# Patient Record
Sex: FEMALE | Race: WHITE | NOT HISPANIC OR LATINO
[De-identification: names, ages, dates, MRNs, and addresses within clinical notes are randomized per-mention and may not be internally consistent; named-entity substitution may affect disease eponyms.]

---

## 2017-08-17 PROBLEM — Z00.00 ENCOUNTER FOR PREVENTIVE HEALTH EXAMINATION: Noted: 2017-08-17

## 2017-11-07 ENCOUNTER — TRANSCRIPTION ENCOUNTER (OUTPATIENT)
Age: 22
End: 2017-11-07

## 2018-02-07 ENCOUNTER — APPOINTMENT (OUTPATIENT)
Dept: OPHTHALMOLOGY | Facility: CLINIC | Age: 23
End: 2018-02-07
Payer: COMMERCIAL

## 2018-02-07 DIAGNOSIS — H53.8 OTHER VISUAL DISTURBANCES: ICD-10-CM

## 2018-02-07 PROCEDURE — 92015 DETERMINE REFRACTIVE STATE: CPT

## 2018-02-07 PROCEDURE — 92004 COMPRE OPH EXAM NEW PT 1/>: CPT

## 2018-03-21 ENCOUNTER — EMERGENCY (EMERGENCY)
Facility: HOSPITAL | Age: 23
LOS: 1 days | Discharge: HOME | End: 2018-03-21
Attending: EMERGENCY MEDICINE | Admitting: EMERGENCY MEDICINE
Payer: COMMERCIAL

## 2018-03-21 VITALS
HEART RATE: 87 BPM | WEIGHT: 220.02 LBS | TEMPERATURE: 98 F | OXYGEN SATURATION: 99 % | SYSTOLIC BLOOD PRESSURE: 126 MMHG | HEIGHT: 62 IN | RESPIRATION RATE: 18 BRPM | DIASTOLIC BLOOD PRESSURE: 70 MMHG

## 2018-03-21 PROCEDURE — 99284 EMERGENCY DEPT VISIT MOD MDM: CPT

## 2018-03-21 PROCEDURE — 99283 EMERGENCY DEPT VISIT LOW MDM: CPT

## 2018-03-21 RX ADMIN — Medication 1 TABLET(S): at 02:00

## 2018-03-21 NOTE — ED PROVIDER NOTE - OBJECTIVE STATEMENT
22yo female who presents with small ddrainage from umbilicus tontie. pt denies trauma or injury to area but noticed blood from her belly button and she statse she has pain and drainage from it, no fever, chills, no vomiting, no other compalints

## 2018-03-21 NOTE — ED ADULT NURSE NOTE - CHPI ED SYMPTOMS NEG
no numbness/no vomiting/no decreased eating/drinking/no weakness/no chills/no tingling/no dizziness/no fever/no nausea

## 2019-01-27 ENCOUNTER — TRANSCRIPTION ENCOUNTER (OUTPATIENT)
Age: 24
End: 2019-01-27

## 2019-02-01 ENCOUNTER — APPOINTMENT (OUTPATIENT)
Dept: ORTHOPEDIC SURGERY | Facility: CLINIC | Age: 24
End: 2019-02-01

## 2019-02-01 ENCOUNTER — APPOINTMENT (OUTPATIENT)
Dept: ORTHOPEDIC SURGERY | Facility: CLINIC | Age: 24
End: 2019-02-01
Payer: COMMERCIAL

## 2019-02-01 VITALS
BODY MASS INDEX: 40.12 KG/M2 | HEIGHT: 62 IN | HEART RATE: 89 BPM | WEIGHT: 218 LBS | DIASTOLIC BLOOD PRESSURE: 78 MMHG | SYSTOLIC BLOOD PRESSURE: 124 MMHG

## 2019-02-01 DIAGNOSIS — Z78.9 OTHER SPECIFIED HEALTH STATUS: ICD-10-CM

## 2019-02-01 DIAGNOSIS — Z82.61 FAMILY HISTORY OF ARTHRITIS: ICD-10-CM

## 2019-02-01 DIAGNOSIS — Z80.3 FAMILY HISTORY OF MALIGNANT NEOPLASM OF BREAST: ICD-10-CM

## 2019-02-01 DIAGNOSIS — Z86.69 PERSONAL HISTORY OF OTHER DISEASES OF THE NERVOUS SYSTEM AND SENSE ORGANS: ICD-10-CM

## 2019-02-01 PROCEDURE — 99204 OFFICE O/P NEW MOD 45 MIN: CPT

## 2019-02-01 NOTE — PHYSICAL EXAM
[de-identified] : Constitutional: Well-nourished, well-developed, No acute distress, obese\par Respiratory:  Good respiratory effort, no SOB\par Lymphatic: No regional lymphadenopathy, no lymphedema\par Psychiatric: Pleasant and normal affect, alert and oriented x3\par Skin: Clean dry and intact B/L UE/LE\par Musculoskeletal: normal except where as noted in regional exam\par \par \par Right Knee:\par APPEARANCE: no marked deformities, no swelling or malalignment\par POSITIVE TENDERNESS:  none\par NONTENDER: jt lines b/l & retinacula b/l, patellar & quadriceps tendons, MCL/LCL, ITB at the lateral femoral condyle & Gerdy's tubercle, pes bursa. \par ROM: full & painless. \par RESISTIVE TESTING: painless resisted knee flex/ext. \par SPECIAL TESTS: stable v/v stress. painless grind. neg Lachman's. neg ant/post drawer. neg Ann's. neg Thessaly test. neg Ever's & Malacrae's\par NEURO: Normal sensation of LE, DTRs 2+/4 patella and achilles\par PULSES: 2+ DP/PT pulses\par B/L Hips: No asymmetry, malalignment, or swelling, Full ROM, 5/5 strength in flexion/ext, IR/ER, Abd/Add, Joints stable\par B/L Ankles: No asymmetry, malalignment, or swelling, Full ROM, 5/5 strength in DF/PF/Inv/Ev, Joints stable\par BIOMECHANICAL EXAM: no marked leg length discrepancy, mild hip abductor weakness b/l, no marked foot pronation, tight hams and ITB b/l.  Normal gait and station\par \par Left Knee:\par APPEARANCE: 1+ swelling, no marked deformities or malalignment\par POSITIVE TENDERNESS: Medial joint line, medial patellar retinaculum, and MPFL\par NONTENDER: lat jt lines, patellar & quadriceps tendons, MCL/LCL, ITB at the lateral femoral condyle & Gerdy's tubercle, pes bursa. \par ROM: full flexion, extension limited by 15 deg due to stiffness and pain  \par RESISTIVE TESTING: painless resisted knee flex/ext. \par SPECIAL TESTS: + apprehension test, + Ann's for pain in med jt line. stable v/v stress. painless grind. neg Lachman's. neg ant/post drawer. neg Thessaly test. neg Ever's & Malacrae's\par NEURO: Normal sensation of LE, DTRs 2+/4 patella and achilles\par PULSES: 2+ DP/PT pulses\par  [de-identified] : I reviewed and clinically correlated the following outside imaging studies,\par \par Procedure was performed at the [facility] \par \par EXAM: KNEE LEFT \par \par \par PROCEDURE DATE: 01/27/2019 \par \par \par INTERPRETATION: CLINICAL INDICATION: Left knee pain \par \par EXAM: 3 views of the left knee \par \par COMPARISON: None \par \par \par IMPRESSION: \par No acute fracture or dislocation. The joint spaces are maintained. No joint \par effusion. Soft tissues are intact. \par

## 2019-02-01 NOTE — HISTORY OF PRESENT ILLNESS
[de-identified] : Patient is here for left leg pain that began on 1/26/19 when she was dancing with friends. She states that her knee "dislocated". She massaged the area and states that it felt like it went back to normal and she was able to walk on it. Later that evening she was walking and states that it occurred again and had pain that continued. She has been unable to fully flex her knee and has been wearing a knee brace given to her when she was told in 2014 that she had a partial tear of her ACL and meniscus. She went to [facility] where xrays were taken that were negative for fracture. She has been using crutches to ambulate and taking Aleve with minimal relief. Denies N/T/R

## 2019-02-01 NOTE — DISCUSSION/SUMMARY
[de-identified] : Discussed findings of today's exam and possible causes of patient's pain.  Educated patient on their most probable diagnosis of left knee pain, possible patellar subluxation.  Reviewed possible courses of treatment, and we collaboratively decided best course of treatment at this time will include conservative management. Patient advised that she has no significant effusion, doubt acute internal derangement at this time. Recommend conservative management with over-the-counter knee compression sleeve with a patella cut out to be worn during the day. Recommend transition off of crutches as soon as tolerated.  Patient will be started on a course of physical therapy to restore normal range of motion and strength as tolerated.  Patient started on a course of oral NSAIDs, prescription given for Naprosyn. If no improvement in 2-4 weeks would recommend an MRI at that time, otherwise recommend continued watchful waiting if she has gradual and continual improvement.  Follow up as needed.  Patient appreciates and agrees with current plan.\par \par This note was generated using dragon medical dictation software.  A reasonable effort has been made for proofreading its contents, but typos may still remain.  If there are any questions or points of clarification needed please notify my office.

## 2019-03-06 ENCOUNTER — APPOINTMENT (OUTPATIENT)
Dept: ORTHOPEDIC SURGERY | Facility: CLINIC | Age: 24
End: 2019-03-06
Payer: COMMERCIAL

## 2019-03-06 VITALS — BODY MASS INDEX: 40.12 KG/M2 | WEIGHT: 218 LBS | HEIGHT: 62 IN

## 2019-03-06 DIAGNOSIS — S83.002A UNSPECIFIED SUBLUXATION OF LEFT PATELLA, INITIAL ENCOUNTER: ICD-10-CM

## 2019-03-06 PROCEDURE — 99214 OFFICE O/P EST MOD 30 MIN: CPT

## 2019-05-10 ENCOUNTER — OTHER (OUTPATIENT)
Age: 24
End: 2019-05-10

## 2020-03-10 ENCOUNTER — APPOINTMENT (OUTPATIENT)
Dept: INTERNAL MEDICINE | Facility: CLINIC | Age: 25
End: 2020-03-10
Payer: COMMERCIAL

## 2020-03-10 VITALS
HEIGHT: 62 IN | SYSTOLIC BLOOD PRESSURE: 130 MMHG | OXYGEN SATURATION: 97 % | WEIGHT: 241 LBS | TEMPERATURE: 99 F | HEART RATE: 86 BPM | BODY MASS INDEX: 44.35 KG/M2 | DIASTOLIC BLOOD PRESSURE: 70 MMHG

## 2020-03-10 DIAGNOSIS — E66.9 OBESITY, UNSPECIFIED: ICD-10-CM

## 2020-03-10 DIAGNOSIS — S83.419A SPRAIN OF MEDIAL COLLATERAL LIGAMENT OF UNSPECIFIED KNEE, INITIAL ENCOUNTER: ICD-10-CM

## 2020-03-10 DIAGNOSIS — Z87.828 PERSONAL HISTORY OF OTHER (HEALED) PHYSICAL INJURY AND TRAUMA: ICD-10-CM

## 2020-03-10 DIAGNOSIS — Z87.19 PERSONAL HISTORY OF OTHER DISEASES OF THE DIGESTIVE SYSTEM: ICD-10-CM

## 2020-03-10 DIAGNOSIS — Z00.00 ENCOUNTER FOR GENERAL ADULT MEDICAL EXAMINATION W/OUT ABNORMAL FINDINGS: ICD-10-CM

## 2020-03-10 DIAGNOSIS — Z82.5 FAMILY HISTORY OF ASTHMA AND OTHER CHRONIC LOWER RESPIRATORY DISEASES: ICD-10-CM

## 2020-03-10 DIAGNOSIS — Z83.49 FAMILY HISTORY OF OTHER ENDOCRINE, NUTRITIONAL AND METABOLIC DISEASES: ICD-10-CM

## 2020-03-10 DIAGNOSIS — Z82.49 FAMILY HISTORY OF ISCHEMIC HEART DISEASE AND OTHER DISEASES OF THE CIRCULATORY SYSTEM: ICD-10-CM

## 2020-03-10 DIAGNOSIS — E28.2 POLYCYSTIC OVARIAN SYNDROME: ICD-10-CM

## 2020-03-10 DIAGNOSIS — G43.909 MIGRAINE, UNSPECIFIED, NOT INTRACTABLE, W/OUT STATUS MIGRAINOSUS: ICD-10-CM

## 2020-03-10 PROCEDURE — 99385 PREV VISIT NEW AGE 18-39: CPT

## 2020-03-11 ENCOUNTER — TRANSCRIPTION ENCOUNTER (OUTPATIENT)
Age: 25
End: 2020-03-11

## 2020-03-25 ENCOUNTER — TRANSCRIPTION ENCOUNTER (OUTPATIENT)
Age: 25
End: 2020-03-25

## 2020-03-25 DIAGNOSIS — R79.9 ABNORMAL FINDING OF BLOOD CHEMISTRY, UNSPECIFIED: ICD-10-CM

## 2020-03-25 LAB
ALBUMIN SERPL ELPH-MCNC: 4.5 G/DL
ALP BLD-CCNC: 69 U/L
ALT SERPL-CCNC: 14 U/L
ANION GAP SERPL CALC-SCNC: 17 MMOL/L
AST SERPL-CCNC: 16 U/L
BASOPHILS # BLD AUTO: 0.05 K/UL
BASOPHILS NFR BLD AUTO: 0.3 %
BILIRUB SERPL-MCNC: 0.2 MG/DL
BUN SERPL-MCNC: 10 MG/DL
CALCIUM SERPL-MCNC: 10 MG/DL
CHLORIDE SERPL-SCNC: 103 MMOL/L
CHOLEST SERPL-MCNC: 178 MG/DL
CHOLEST/HDLC SERPL: 2.8 RATIO
CO2 SERPL-SCNC: 21 MMOL/L
CREAT SERPL-MCNC: 0.57 MG/DL
EOSINOPHIL # BLD AUTO: 0.06 K/UL
EOSINOPHIL NFR BLD AUTO: 0.4 %
ESTIMATED AVERAGE GLUCOSE: 100 MG/DL
FSH SERPL-MCNC: 5.5 IU/L
GLUCOSE SERPL-MCNC: 75 MG/DL
HBA1C MFR BLD HPLC: 5.1 %
HCT VFR BLD CALC: 41.2 %
HDLC SERPL-MCNC: 63 MG/DL
HGB BLD-MCNC: 13 G/DL
IMM GRANULOCYTES NFR BLD AUTO: 0.4 %
LDLC SERPL CALC-MCNC: 96 MG/DL
LH SERPL-ACNC: 5.4 IU/L
LYMPHOCYTES # BLD AUTO: 3.13 K/UL
LYMPHOCYTES NFR BLD AUTO: 19.5 %
MAN DIFF?: NORMAL
MCHC RBC-ENTMCNC: 28.3 PG
MCHC RBC-ENTMCNC: 31.6 GM/DL
MCV RBC AUTO: 89.6 FL
MONOCYTES # BLD AUTO: 1.02 K/UL
MONOCYTES NFR BLD AUTO: 6.4 %
NEUTROPHILS # BLD AUTO: 11.71 K/UL
NEUTROPHILS NFR BLD AUTO: 73 %
PLATELET # BLD AUTO: 409 K/UL
POTASSIUM SERPL-SCNC: 4.3 MMOL/L
PROT SERPL-MCNC: 7.2 G/DL
RBC # BLD: 4.6 M/UL
RBC # FLD: 13.1 %
SODIUM SERPL-SCNC: 140 MMOL/L
TESTOST SERPL-MCNC: 35.3 NG/DL
TRIGL SERPL-MCNC: 94 MG/DL
TSH SERPL-ACNC: 1.36 UIU/ML
WBC # FLD AUTO: 16.04 K/UL

## 2020-03-29 ENCOUNTER — TRANSCRIPTION ENCOUNTER (OUTPATIENT)
Age: 25
End: 2020-03-29

## 2020-03-30 ENCOUNTER — TRANSCRIPTION ENCOUNTER (OUTPATIENT)
Age: 25
End: 2020-03-30

## 2020-04-02 PROBLEM — Z82.5 FAMILY HISTORY OF ASTHMA: Status: ACTIVE | Noted: 2020-04-02

## 2020-04-02 PROBLEM — S83.419A TEAR OF MEDIAL COLLATERAL LIGAMENT OF KNEE: Status: RESOLVED | Noted: 2020-04-02 | Resolved: 2020-04-02

## 2020-04-02 PROBLEM — Z87.828 HISTORY OF TEAR OF ANTERIOR CRUCIATE LIGAMENT: Status: RESOLVED | Noted: 2020-04-02 | Resolved: 2020-04-02

## 2020-04-02 PROBLEM — E28.2 PCOS (POLYCYSTIC OVARIAN SYNDROME): Status: ACTIVE | Noted: 2020-04-02

## 2020-04-02 PROBLEM — Z83.49 FAMILY HISTORY OF HYPOTHYROIDISM: Status: ACTIVE | Noted: 2020-04-02

## 2020-04-02 PROBLEM — Z82.49 FAMILY HISTORY OF HYPERTENSION: Status: ACTIVE | Noted: 2020-04-02

## 2020-04-02 PROBLEM — G43.909 MIGRAINES: Status: ACTIVE | Noted: 2018-02-07

## 2020-04-02 PROBLEM — Z87.19 HISTORY OF HEMORRHOIDS: Status: RESOLVED | Noted: 2020-04-02 | Resolved: 2020-04-02

## 2020-04-02 PROBLEM — E66.9 OBESITY: Status: ACTIVE | Noted: 2020-03-29

## 2020-04-02 RX ORDER — NITROFURANTOIN (MONOHYDRATE/MACROCRYSTALS) 25; 75 MG/1; MG/1
100 CAPSULE ORAL
Qty: 10 | Refills: 0 | Status: DISCONTINUED | COMMUNITY
Start: 2017-11-07 | End: 2020-04-02

## 2020-04-02 RX ORDER — IBUPROFEN 600 MG/1
600 TABLET, FILM COATED ORAL
Qty: 30 | Refills: 0 | Status: DISCONTINUED | COMMUNITY
Start: 2017-06-26 | End: 2020-04-02

## 2020-04-02 NOTE — PLAN
[FreeTextEntry1] : Pt has right idea with diet and exercise but has often been undermined in best efforts towards the latter secondary to musculoskeletal issues which have arisen, some notable.  Has continued intermittent l knee discomfort and pes planus - has adjusted footwear and will pursue PT, work with  to help optimize knee.  again tailoring diet.  but especially as weight has impact on le joints which have been at issue, she is eager to take a more aggressive approach to weight loss.  is wiling to keep up best approach to diet/exercise while also utilizing medication which had worked well for her mother in past - qsymia.  we discussed risks, benefits and she is interested in proceeding.  would limit to 3 months tx while working aggressively on diet/exercise as well.  Will check up on bloods.  Should see GYN for initial visit, pap - ref given.

## 2020-04-02 NOTE — HISTORY OF PRESENT ILLNESS
[FreeTextEntry1] : new pt [de-identified] : Pt is in to establish care and to discuss medications to help her lose weight.  She has tried multiple approaches to diet and exercise in recent and more distant pasts.  She has often found herself undermined by musculoskeletal issues which will arise when she gets into more routine exercise programs.  She has gone to low calorie diets, at times and again recently emphasizing vegetables and smoothies.  Has knee discomfort at times which relates to hx torn/injured acl and mcl, partial dislocation of knee.  also has had pes planus which may predispose to some of these issues.  can have some pain with stairs - down stairs more than up.  Has had benefit from PT in past and would be interested in this again if practical in the current environment.  Does have a home gym set-up.  occasionally has had hemorrhoids and fissures but not recently.  has migraines which can be worse during periods - L frontal and R frontal.  Has been told has PCOS in past though periods are mostly regular/somewhat longer than avg interval.  Does not follow with GYN and has not yet had pap. 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

## 2020-04-02 NOTE — HEALTH RISK ASSESSMENT
[Very Good] : ~his/her~  mood as very good [] : No [No] : In the past 12 months have you used drugs other than those required for medical reasons? No [No falls in past year] : Patient reported no falls in the past year [0] : 2) Feeling down, depressed, or hopeless: Not at all (0) [de-identified] : as in hpi [de-identified] : as in hpi [[id number]] : 0 [Change in mental status noted] : No change in mental status noted [Language] : denies difficulty with language [Behavior] : denies difficulty with behavior [Learning/Retaining New Information] : denies difficulty learning/retaining new information [Handling Complex Tasks] : denies difficulty handling complex tasks [Reasoning] : denies difficulty with reasoning [Spatial Ability and Orientation] : denies difficulty with spatial ability and orientation [None] : None [With Family] : lives with family [Employed] : employed [Single] : single [Feels Safe at Home] : Feels safe at home [Fully functional (bathing, dressing, toileting, transferring, walking, feeding)] : Fully functional (bathing, dressing, toileting, transferring, walking, feeding) [Fully functional (using the telephone, shopping, preparing meals, housekeeping, doing laundry, using] : Fully functional and needs no help or supervision to perform IADLs (using the telephone, shopping, preparing meals, housekeeping, doing laundry, using transportation, managing medications and managing finances) [Reports changes in hearing] : Reports no changes in hearing [Reports changes in vision] : Reports no changes in vision [Reports normal functional visual acuity (ie: able to read med bottle)] : Reports normal functional visual acuity [Reports changes in dental health] : Reports no changes in dental health [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

## 2020-04-30 ENCOUNTER — TRANSCRIPTION ENCOUNTER (OUTPATIENT)
Age: 25
End: 2020-04-30

## 2020-07-26 ENCOUNTER — TRANSCRIPTION ENCOUNTER (OUTPATIENT)
Age: 25
End: 2020-07-26

## 2021-01-20 ENCOUNTER — RESULT REVIEW (OUTPATIENT)
Age: 26
End: 2021-01-20

## 2021-01-20 ENCOUNTER — APPOINTMENT (OUTPATIENT)
Dept: OBGYN | Facility: CLINIC | Age: 26
End: 2021-01-20
Payer: COMMERCIAL

## 2021-01-20 PROCEDURE — 99072 ADDL SUPL MATRL&STAF TM PHE: CPT

## 2021-01-20 PROCEDURE — 99385 PREV VISIT NEW AGE 18-39: CPT

## 2021-03-20 ENCOUNTER — RESULT REVIEW (OUTPATIENT)
Age: 26
End: 2021-03-20

## 2021-03-20 ENCOUNTER — OUTPATIENT (OUTPATIENT)
Dept: OUTPATIENT SERVICES | Facility: HOSPITAL | Age: 26
LOS: 1 days | End: 2021-03-20
Payer: COMMERCIAL

## 2021-03-20 ENCOUNTER — APPOINTMENT (OUTPATIENT)
Dept: ULTRASOUND IMAGING | Facility: CLINIC | Age: 26
End: 2021-03-20
Payer: COMMERCIAL

## 2021-03-20 DIAGNOSIS — Z00.8 ENCOUNTER FOR OTHER GENERAL EXAMINATION: ICD-10-CM

## 2021-03-20 PROCEDURE — 76856 US EXAM PELVIC COMPLETE: CPT

## 2021-03-20 PROCEDURE — 76830 TRANSVAGINAL US NON-OB: CPT

## 2021-03-20 PROCEDURE — 76856 US EXAM PELVIC COMPLETE: CPT | Mod: 26

## 2021-03-20 PROCEDURE — 76830 TRANSVAGINAL US NON-OB: CPT | Mod: 26

## 2021-07-20 ENCOUNTER — TRANSCRIPTION ENCOUNTER (OUTPATIENT)
Age: 26
End: 2021-07-20

## 2022-05-07 ENCOUNTER — NON-APPOINTMENT (OUTPATIENT)
Age: 27
End: 2022-05-07

## 2022-12-19 ENCOUNTER — EMERGENCY (EMERGENCY)
Facility: HOSPITAL | Age: 27
LOS: 1 days | Discharge: HOME | End: 2022-12-19
Attending: EMERGENCY MEDICINE | Admitting: EMERGENCY MEDICINE
Payer: COMMERCIAL

## 2022-12-19 VITALS
DIASTOLIC BLOOD PRESSURE: 70 MMHG | WEIGHT: 218.48 LBS | TEMPERATURE: 98 F | HEIGHT: 62 IN | RESPIRATION RATE: 17 BRPM | OXYGEN SATURATION: 100 % | SYSTOLIC BLOOD PRESSURE: 106 MMHG | HEART RATE: 63 BPM

## 2022-12-19 PROCEDURE — 99283 EMERGENCY DEPT VISIT LOW MDM: CPT

## 2022-12-19 RX ORDER — HYDROCORTISONE/PRAMOXINE 2.5 %-1 %
1 CREAM WITH APPLICATOR RECTAL
Qty: 21 | Refills: 0
Start: 2022-12-19 | End: 2022-12-25

## 2022-12-19 NOTE — ED ADULT NURSE REASSESSMENT NOTE - NS ED NURSE REASSESS COMMENT FT1
pt seen and examined by MD. d/c to self. will take OTC medication at home. verbalized understanding of d/c instructions. left unit in NAD. ambulated unassisted

## 2022-12-19 NOTE — ED PROVIDER NOTE - PATIENT PORTAL LINK FT
You can access the FollowMyHealth Patient Portal offered by [facility] by registering at the following website: [email or web address]. By joining [employer]’s FollowMyHealth portal, you will also be able to view your health information using other applications (apps) compatible with our system.

## 2022-12-19 NOTE — ED PROVIDER NOTE - OBJECTIVE STATEMENT
27 y.o. F c/o rectal pain, has h/o hemorrhoids and rectal fissures in the past, also constipation, here for same, has been doing sitz baths and started a stool softener but not a lot of relief, mostly pain with BMs right now, no abd pain, no fever, no other symptoms, hasn't tried any creams

## 2022-12-19 NOTE — ED PROVIDER NOTE - NSFOLLOWUPINSTRUCTIONS_ED_ALL_ED_FT
DIBUCAINE OVER-THE-COUNTER NUMBING CREAM FOR RECTUM, USE AS ADVISED ON PACKAGE (ASK PHARMACIST TO HELP YOU LOCATE IF NEEDED)        Rectal Pain    WHAT YOU NEED TO KNOW:    Rectal pain can be caused by a number of conditions, such as hemorrhoids, an abscess, trauma, or anal tear. Infection, muscle spasms, or anal intercourse can also cause rectal pain.     DISCHARGE INSTRUCTIONS:    Medicines: You may need any of the following:   •NSAIDs, such as ibuprofen, help decrease swelling, pain, and fever. This medicine is available with or without a doctor's order. NSAIDs can cause stomach bleeding or kidney problems in certain people. If you take blood thinner medicine, always ask your healthcare provider if NSAIDs are safe for you. Always read the medicine label and follow directions.      •Prescription pain medicine may be given. Ask how to take this medicine safely.      •Antibiotics help treat or prevent a bacterial infection.      •Bowel movement softeners help soften your bowel movement. They help prevent straining and more damage to the area.      •Take your medicine as directed. Contact your healthcare provider if you think your medicine is not helping or if you have side effects. Tell your provider if you are allergic to any medicine. Keep a list of the medicines, vitamins, and herbs you take. Include the amounts, and when and why you take them. Bring the list or the pill bottles to follow-up visits. Carry your medicine list with you in case of an emergency.      Return to the emergency department if:   •You have severe pain.          Contact your healthcare provider if:   •Your pain does not decrease after 1 to 2 days of treatment.      •You cannot take the medicine prescribed for your condition.      •You have questions or concerns about your condition or care.      Take a sitz bath: Fill a bathtub with 4 to 6 inches of warm water. You may also use a sitz bath pan that fits over a toilet. Sit in the sitz bath for 20 minutes. Do this 2 to 3 times a day, or as directed. The warm water can help decrease pain, muscle spasms, or swelling.     Apply heat: Apply a warm, moist compress on your anus for 20 to 30 minutes every 2 hours for as many days as directed. Heat helps decrease pain and muscle spasms.    Eat high-fiber foods: This will help prevent constipation and soften your bowel movements. High-fiber foods include fruit, vegetables, whole-grain breads and cereals, and beans. A dietitian or healthcare provider can help you create a high-fiber meal plan.     Drink liquids as directed: You may need to drink more liquid than usual to help soften your bowel movements. Ask how much liquid to drink each day and which liquids are best for you.     Follow up with your healthcare provider as directed: Write down your questions so you remember to ask them during your visits.

## 2022-12-19 NOTE — ED ADULT NURSE NOTE - NSIMPLEMENTINTERV_GEN_ALL_ED
Implemented All Universal Safety Interventions:  [place] to call system. Call bell, personal items and telephone within reach. Instruct patient to call for assistance. Room bathroom lighting operational. Non-slip footwear when patient is off stretcher. Physically safe environment: no spills, clutter or unnecessary equipment. Stretcher in lowest position, wheels locked, appropriate side rails in place.

## 2022-12-19 NOTE — ED ADULT TRIAGE NOTE - CHIEF COMPLAINT QUOTE
I have a and anal fissure so after a BM I am in a lot of pain I have a hemorrhoid and anal fissure so after a BM I am in a lot of pain

## 2022-12-19 NOTE — ED ADULT NURSE NOTE - HAVE YOU RECEIVED AT LEAST TWO PFIZER AND/OR MODERNA VACCINATIONS (IN ANY COMBINATION) AND/OR ONE JOHNSON & JOHNSON VACCINATION?
Reason For Visit  GARFIELD FLYNN is here today for a nurse visit for immunizations FLU VAC.   Patient accompanied by mother .      Current Meds   1. Dicyclomine HCl - 10 MG Oral Capsule; TAKE 1 CAPSULE EVERY 6 HOURS AS   NEEDED;   Therapy: 08Jan2018 to (Evaluate:08May2018)  Requested for: 08Jan2018; Last   Rx:[id number] Ordered   2. Junel FE 1/20 1-20 MG-MCG Oral Tablet; TAKE 1 TABLET DAILY;   Therapy: 24Apr2015 to (Evaluate:21Nov2018)  Requested for: [id number]; Last   Rx:[id number] Ordered   3. Omeprazole 20 MG Oral Capsule Delayed Release; TAKE 1 CAPSULE DAILY EVERY   MORNING BEFORE BREAKFAST;   Therapy: [id number] to (Complete:[id number])  Requested for: 83Xlj6882; Last   Rx:[id number] Ordered    Allergies  No Known Drug Allergies    Vitals  Vital Signs    Recorded: 23Sep2018 11:56AM   Temperature 98.5 F, Oral     Assessment   1. Encounter for preventive health examination (Z00.00)   2. Encounter for immunization (Z23)    Plan   1. Fluzone Quadrivalent 0.5 ML Intramuscular Suspension    Patient Instructions Fluzone 0.5 mls administered today in L Deltoid IM  Pt tolerated procedure well  .   Return to Clinic as needed.      Signatures   Electronically signed by : DOROTHEA ESCALANTE; Sep 23 2018 11:58AM CST (Author)    
Yes

## 2022-12-19 NOTE — ED ADULT TRIAGE NOTE - RESPIRATORY RATE (BREATHS/MIN)
Preoperative diagnosis: #1 right shoulder rotator cuff tendinitis; #2 right shoulder partial-thickness rotator cuff tearing; #3 right shoulder acromioclavicular joint arthritis    Postoperative diagnosis: The same    Procedure: #1 right shoulder arthroscopy with extensive debridement of rotator cuff, labrum, and biceps tendon remnant; #2 right shoulder arthroscopic subacromial decompression; #3 right shoulder arthroscopic distal clavicle excision    Surgeon: Brain Humphrey M.D.    Anesthesia: Gen. with interscalene nerve block to the right arm    Complications: None    Blood loss: Minimal    Specimens: None    Drains: None    Indications: The patient has experienced right shoulder pain for more than 1 year. She has findings typical of rotator cuff tendinitis and acromioclavicular joint arthritis. There is partial-thickness rotator cuff tearing. She has been through appropriate conservative treatment measures without resolution of her symptoms. I have offered her shoulder arthroscopy. She has elected to proceed.    Procedure: The patient was brought to the operating room. The right shoulder was identified as the correct area for surgery. An interscalene nerve block was placed to the right upper extremity followed by general anesthetic and an appropriate dose of intravenous antibiotics. Examination of the right shoulder revealed normal passive range of motion and stability. The patient was placed in the left lateral decubitus position and secured there with a beanbag, body straps, and tape. The legs were well padded and an axillary roll was placed. The right arm and shoulder were prepped and draped in sterile fashion and the arm was placed in 10 pounds of suspension traction with the shoulder slightly flexed and abducted. Landmarks were identified and appropriately marked. A spinal needle was placed into the posterior glenohumeral joint at the planned posterior portal site. The joint was distended with saline solution. A  skin nick was made the same site and the scope cannula was placed. The Wissinger ruth technique was used to establish an anterior portal site in the rotator interval. Diagnostic arthroscopy of the glenohumeral joint was performed with the following findings noted.    The long head of biceps tendon was chronically torn. There was a small stump of the ice at tendon remnant still attached to the superior labral area. The superior labrum was generally torn and somewhat deteriorated. The articular surface of the glenoid was normal but the humeral head showed mild diffuse chondromalacia. The inferior recess was normal. The extreme posterior rotator cuff is normal. There was partial thickness tearing involving the anterior infraspinatus tendon and all of the supraspinatus tendon near its attachment to the greater tuberosity. The subscapularis tendon was normal.    Within the glenohumeral joint I first debrided the long head of the biceps tendon remnant and the superior labral tissue to a stable margin. I then used the shaver to debride the supraspinatus and infraspinatus tendons. I removed all the frayed tissue. It was apparent that the amount of tearing involved perhaps 30% or so of the thickness of the supraspinatus and infraspinatus tendons. The remaining tissue seems to be of good quality. I did percutaneous a place a suture through the area of most significant rotator cuff tearing such that I could view it on the bursal side when I performed bursoscopy.    The arthroscopic instruments were removed from the glenohumeral joint and redirected into the subacromial space. A lateral portal site was established. The bursal surface of the rotator cuff was visualized. Specifically the area where the suture was traversing the cuff tissue was carefully inspected. The bursal side of the rotator cuff was in reasonable condition such that I did not feel rotator cuff repair was necessary.    The radiofrequency probe was used to strip  soft tissues from the undersurface of the anterior one half of the acromion and the distal clavicle. The coracoacromial ligament was released from its acromial attachment point in the process of doing so. The acromion had very prominent anterolateral spurring. There is also hypertrophic spurring arising from the acromioclavicular joint region.    The bur was used from the lateral portal incision site with the cutting block technique to perform a subacromial decompression. I removed approximately 8 mm of bone from the most prominent areas of the acromion, converting it to type I morphology.    The working cannula was redirected to the anterior portal incision site. Soft tissues were stripped from the distal end of clavicle, clearly delineating its borders. There is significant spurring arising from the distal end of the clavicle. The bur was used to perform a distal clavicle excision removing approximately 1 cm bone from the distal end of the clavicle.    The arthroscopic instruments were removed from the subacromial space. The fluid was flushed out. The portal sites were closed with nylon suture. Sterile dressings were placed followed by a cold therapy pad and a sling to the arm.    This completed the procedure. The patient tolerated it well. There were no complications. Final counts were correct. She was awoken and taken to the recovery room in good condition.   17

## 2022-12-19 NOTE — ED ADULT NURSE NOTE - OBJECTIVE STATEMENT
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 reports pain more intense. was taking baths and had only temporary relief of pain; did not take and pain medications at home. has a GI doctor but hasn't seen him/her in years

## 2023-02-14 ENCOUNTER — NON-APPOINTMENT (OUTPATIENT)
Age: 28
End: 2023-02-14

## 2023-03-05 NOTE — ED ADULT NURSE NOTE - NSSUHOSCREENINGYN_ED_ALL_ED
Yes - the patient is able to be screened Pt lives with his fiance in a 2nd floor walk-up. Pt reports that prior to admission, pt was independent in all ADLs and IADLs, and ambulates with no device. Pt is R hand dominant, wears glasses for reading and distance.

## 2023-04-03 ENCOUNTER — APPOINTMENT (OUTPATIENT)
Age: 28
End: 2023-04-03
Payer: COMMERCIAL

## 2023-04-03 VITALS
BODY MASS INDEX: 42.69 KG/M2 | HEIGHT: 62 IN | HEART RATE: 74 BPM | TEMPERATURE: 97.2 F | OXYGEN SATURATION: 99 % | RESPIRATION RATE: 17 BRPM | DIASTOLIC BLOOD PRESSURE: 84 MMHG | SYSTOLIC BLOOD PRESSURE: 122 MMHG | WEIGHT: 232 LBS

## 2023-04-03 DIAGNOSIS — Z83.79 FAMILY HISTORY OF OTHER DISEASES OF THE DIGESTIVE SYSTEM: ICD-10-CM

## 2023-04-03 DIAGNOSIS — Z80.0 FAMILY HISTORY OF MALIGNANT NEOPLASM OF DIGESTIVE ORGANS: ICD-10-CM

## 2023-04-03 DIAGNOSIS — K64.4 RESIDUAL HEMORRHOIDAL SKIN TAGS: ICD-10-CM

## 2023-04-03 DIAGNOSIS — K60.2 ANAL FISSURE, UNSPECIFIED: ICD-10-CM

## 2023-04-03 PROCEDURE — 99204 OFFICE O/P NEW MOD 45 MIN: CPT | Mod: 25

## 2023-04-03 PROCEDURE — 46600 DIAGNOSTIC ANOSCOPY SPX: CPT

## 2023-04-03 RX ORDER — PHENTERMINE AND TOPIRAMATE 7.5; 46 MG/1; MG/1
7.5-46 CAPSULE, EXTENDED RELEASE ORAL DAILY
Qty: 1 | Refills: 2 | Status: DISCONTINUED | COMMUNITY
Start: 2020-03-29 | End: 2023-04-03

## 2023-04-03 RX ORDER — NAPROXEN 500 MG/1
500 TABLET ORAL
Qty: 60 | Refills: 1 | Status: DISCONTINUED | COMMUNITY
Start: 2019-02-01 | End: 2023-04-03

## 2023-04-03 NOTE — ASSESSMENT
[FreeTextEntry1] : 29 yo female with a recent acute anal fissure, symptoms are improving however she has a very symptomatic large external hemorrhoid.  I advised for hemorrhoidectomy and concomitant Botox injection given the two fissures and sphincter spasm.  We will do this at [facility].  I discussed the details risks benefits alternatives and expectations of recovery with the patient.  All questions answered.  My office will schedule the procedure.\par \par

## 2023-04-03 NOTE — HISTORY OF PRESENT ILLNESS
[FreeTextEntry1] : Lizbeth is a 27 y/o female here with complaint of anorectal pain.  Reports she had an episode of 1 week of constipation 3 weeks ago and developed anorectal pain that was cut like and lingered for an hour after BM, sometimes it lingered for the rest of the day.  Intermittent BRB NM on TP and into the bowl, improving.  She started using nifedipine ointment prescribed by her GI and reports today that the fissure pain has greatly improved and nearly resolved.  However she now has significant pain from her chronic external hemorrhoid that is now more swollen and gives her significant discomfort throughout the day.  Currently she has no constipation and takes twice daily MiraLAX.\par Never had a colonoscopy. \par \par No episodes of incontinence.  Taking nifedipine/lidocaine gel and hydrocortisone. Not taking any anticoagulants.

## 2023-04-03 NOTE — CONSULT LETTER
[Dear  ___] : Dear  [unfilled], [Consult Letter:] : I had the pleasure of evaluating your patient, [unfilled]. [Please see my note below.] : Please see my note below. [Consult Closing:] : Thank you very much for allowing me to participate in the care of this patient.  If you have any questions, please do not hesitate to contact me. [Sincerely,] : Sincerely, [FreeTextEntry2] : Dr Brandi Oates [FreeTextEntry3] : Ivette Marquez M.D., F.A.C.S., F.RUDY.S.C.R.S.\par Assistant Professor of Surgery\par [employer] [facility]\par \par

## 2023-04-03 NOTE — PHYSICAL EXAM
[FreeTextEntry1] : This is a 28 year-old well-developed female in no apparent distress.\par \par HEENT normocephalic, anicteric, external ears normal bilaterally, EOMs intact.\par \par Cardiac - regular rate and rhythm.\par \par Abdomen soft, nontender, nondistended, no masses. No hepatosplenomegaly.\par \par No inguinal lymphadenopathy bilaterally.\par \par  female-normal external genitalia. Pelvic deferred.\par \par Examination of the perineum reveals a large right posterolateral external hemorrhoid with a fissure at its base.  There is also large fissure in the anterior midline. Digital rectal examination reveals mild sphincter spasm. \par Anoscopy reveals small to moderate size internal hemorrhoids.\par \par Neuro-cranial nerves grossly intact. Normal gait.\par \par Psychiatric-oriented to time place and person. Good understanding of conversation.

## 2023-04-17 ENCOUNTER — OUTPATIENT (OUTPATIENT)
Dept: OUTPATIENT SERVICES | Facility: HOSPITAL | Age: 28
LOS: 1 days | End: 2023-04-17
Payer: COMMERCIAL

## 2023-04-17 VITALS
OXYGEN SATURATION: 97 % | HEIGHT: 62 IN | SYSTOLIC BLOOD PRESSURE: 122 MMHG | DIASTOLIC BLOOD PRESSURE: 78 MMHG | RESPIRATION RATE: 18 BRPM | TEMPERATURE: 98 F | WEIGHT: 210.1 LBS | HEART RATE: 68 BPM

## 2023-04-17 DIAGNOSIS — F12.90 CANNABIS USE, UNSPECIFIED, UNCOMPLICATED: ICD-10-CM

## 2023-04-17 DIAGNOSIS — Z01.818 ENCOUNTER FOR OTHER PREPROCEDURAL EXAMINATION: ICD-10-CM

## 2023-04-17 DIAGNOSIS — K60.2 ANAL FISSURE, UNSPECIFIED: ICD-10-CM

## 2023-04-17 DIAGNOSIS — K08.409 PARTIAL LOSS OF TEETH, UNSPECIFIED CAUSE, UNSPECIFIED CLASS: Chronic | ICD-10-CM

## 2023-04-17 DIAGNOSIS — K64.4 RESIDUAL HEMORRHOIDAL SKIN TAGS: ICD-10-CM

## 2023-04-17 LAB
HCT VFR BLD CALC: 40 % — SIGNIFICANT CHANGE UP (ref 34.5–45)
HGB BLD-MCNC: 13 G/DL — SIGNIFICANT CHANGE UP (ref 11.5–15.5)
MCHC RBC-ENTMCNC: 28.1 PG — SIGNIFICANT CHANGE UP (ref 27–34)
MCHC RBC-ENTMCNC: 32.5 GM/DL — SIGNIFICANT CHANGE UP (ref 32–36)
MCV RBC AUTO: 86.6 FL — SIGNIFICANT CHANGE UP (ref 80–100)
NRBC # BLD: 0 /100 WBCS — SIGNIFICANT CHANGE UP (ref 0–0)
PLATELET # BLD AUTO: 366 K/UL — SIGNIFICANT CHANGE UP (ref 150–400)
RBC # BLD: 4.62 M/UL — SIGNIFICANT CHANGE UP (ref 3.8–5.2)
RBC # FLD: 13.5 % — SIGNIFICANT CHANGE UP (ref 10.3–14.5)
WBC # BLD: 11.83 K/UL — HIGH (ref 3.8–10.5)
WBC # FLD AUTO: 11.83 K/UL — HIGH (ref 3.8–10.5)

## 2023-04-17 PROCEDURE — 85027 COMPLETE CBC AUTOMATED: CPT

## 2023-04-17 PROCEDURE — G0463: CPT

## 2023-04-17 NOTE — H&P PST ADULT - NSICDXPASTMEDICALHX_GEN_ALL_CORE_FT
PAST MEDICAL HISTORY:  2019 novel coronavirus disease (COVID-19)     Anal fissure, unspecified     Marijuana use     Residual hemorrhoidal skin tags

## 2023-04-17 NOTE — H&P PST ADULT - HISTORY OF PRESENT ILLNESS
28yr old female presents to PST for a scheduled Single Quadrant Excisional Hemorrhoidectomy w/ Botox Injection on 4/21/23. Pt admits to occasional marijuana use 2-3x's a week. Denies recent fevers, chills, cough, chest pain or SOB and feels well otherwise.

## 2023-04-17 NOTE — H&P PST ADULT - NSANTHOSAYNRD_GEN_A_CORE
No. YAHIR screening performed.  STOP BANG Legend: 0-2 = LOW Risk; 3-4 = INTERMEDIATE Risk; 5-8 = HIGH Risk

## 2023-04-17 NOTE — H&P PST ADULT - BP NONINVASIVE SYSTOLIC (MM HG)
122 Split-Thickness Skin Graft Text: The defect edges were debeveled with a #15 scalpel blade.  Given the location of the defect, shape of the defect and the proximity to free margins a split thickness skin graft was deemed most appropriate.  Using a sterile surgical marker, the primary defect shape was transferred to the donor site. The split thickness graft was then harvested.  The skin graft was then placed in the primary defect and oriented appropriately.

## 2023-04-17 NOTE — H&P PST ADULT - SOURCE OF INFORMATION, PROFILE
Consent: Written consent was obtained and risks were reviewed including but not limited to scarring, infection, bleeding, scabbing, incomplete removal, nerve damage and allergy to anesthesia. patient

## 2023-04-20 ENCOUNTER — TRANSCRIPTION ENCOUNTER (OUTPATIENT)
Age: 28
End: 2023-04-20

## 2023-04-21 ENCOUNTER — OUTPATIENT (OUTPATIENT)
Dept: OUTPATIENT SERVICES | Facility: HOSPITAL | Age: 28
LOS: 1 days | End: 2023-04-21
Payer: COMMERCIAL

## 2023-04-21 ENCOUNTER — TRANSCRIPTION ENCOUNTER (OUTPATIENT)
Age: 28
End: 2023-04-21

## 2023-04-21 ENCOUNTER — RESULT REVIEW (OUTPATIENT)
Age: 28
End: 2023-04-21

## 2023-04-21 ENCOUNTER — APPOINTMENT (OUTPATIENT)
Dept: SURGERY | Facility: HOSPITAL | Age: 28
End: 2023-04-21
Payer: COMMERCIAL

## 2023-04-21 VITALS
OXYGEN SATURATION: 100 % | DIASTOLIC BLOOD PRESSURE: 67 MMHG | HEART RATE: 76 BPM | SYSTOLIC BLOOD PRESSURE: 117 MMHG | RESPIRATION RATE: 12 BRPM

## 2023-04-21 VITALS
HEART RATE: 95 BPM | HEIGHT: 62 IN | RESPIRATION RATE: 16 BRPM | OXYGEN SATURATION: 97 % | TEMPERATURE: 98 F | DIASTOLIC BLOOD PRESSURE: 76 MMHG | WEIGHT: 210.1 LBS | SYSTOLIC BLOOD PRESSURE: 134 MMHG

## 2023-04-21 DIAGNOSIS — K60.2 ANAL FISSURE, UNSPECIFIED: ICD-10-CM

## 2023-04-21 DIAGNOSIS — K64.4 RESIDUAL HEMORRHOIDAL SKIN TAGS: ICD-10-CM

## 2023-04-21 DIAGNOSIS — K08.409 PARTIAL LOSS OF TEETH, UNSPECIFIED CAUSE, UNSPECIFIED CLASS: Chronic | ICD-10-CM

## 2023-04-21 PROCEDURE — 46505 CHEMODENERVATION ANAL MUSC: CPT

## 2023-04-21 PROCEDURE — 46261 REMOVE IN/EX HEM GRPS & FISS: CPT

## 2023-04-21 PROCEDURE — C1889: CPT

## 2023-04-21 PROCEDURE — 46260 REMOVE IN/EX HEM GROUPS 2+: CPT

## 2023-04-21 PROCEDURE — 88304 TISSUE EXAM BY PATHOLOGIST: CPT | Mod: 26

## 2023-04-21 PROCEDURE — 88304 TISSUE EXAM BY PATHOLOGIST: CPT

## 2023-04-21 DEVICE — SURGICEL FIBRILLAR 2 X 4": Type: IMPLANTABLE DEVICE | Status: FUNCTIONAL

## 2023-04-21 RX ORDER — OXYCODONE HYDROCHLORIDE 5 MG/1
1 TABLET ORAL
Qty: 10 | Refills: 0
Start: 2023-04-21 | End: 2023-04-23

## 2023-04-21 NOTE — BRIEF OPERATIVE NOTE - NSICDXBRIEFPREOP_GEN_ALL_CORE_FT
PRE-OP DIAGNOSIS:  Hemorrhoids 21-Apr-2023 11:41:09  Trip Odell  Anal fissure 21-Apr-2023 11:41:02  Trip Odell

## 2023-04-21 NOTE — PRE-ANESTHESIA EVALUATION ADULT - NSANTHPEFT_GEN_ALL_CORE
RRR  Lungs clear  Sitting comfortably up in chair
inadequate oral intake for 5 days PTA and in-house, wt loss reported , amount not available

## 2023-04-21 NOTE — ASU DISCHARGE PLAN (ADULT/PEDIATRIC) - NURSING INSTRUCTIONS
Next dose of Tylenol will be on or after _____4:25 PM______ ,today/tonight and every 6 hours afterwards as needed for pain management, do not take any Tylenol containing products until this time. Your first dose of Tylenol was given at ___10:25 AM________. Do not exceed more than 4000mg of Tylenol in one 24 hour setting.

## 2023-04-21 NOTE — ASU PATIENT PROFILE, ADULT - NS SC CAGE ALCOHOL EYE OPENER
----- Message from Donnie Way MD sent at 12/27/2018  2:59 PM CST -----  Discussed results with cancer navigator at [facility] Mandie Philippe who will facilitate referral to surgical specialist based on radiology's recommendations after radiologist weeks to patient with positive test results.  
No phone call made to patient stat this time.    Surgical specialist will speak with patient.   
no

## 2023-04-21 NOTE — ASU DISCHARGE PLAN (ADULT/PEDIATRIC) - ASU DC SPECIAL INSTRUCTIONSFT
Please see preprinted discharge sheet.   - There is a foamy packing that was placed underneath your dressing as part of your surgery. This substance helps to stop the bleeding and can clump up when it gets wet, which is normal. It will wash away with your first shower.

## 2023-04-21 NOTE — ASU DISCHARGE PLAN (ADULT/PEDIATRIC) - NS MD DC FALL RISK RISK
For information on Fall & Injury Prevention, visit: [email or web address] OR  [email or web address] OR  https://www.cdc.gov/steadi/patient.html

## 2023-04-21 NOTE — ASU DISCHARGE PLAN (ADULT/PEDIATRIC) - CARE PROVIDER_API CALL
Ivette Marquez)  ColonRectal Surgery; Surgery  [address], [address]  [place] [address]  Phone: [phone]  Fax: [phone]  Follow Up Time: 2 weeks

## 2023-04-21 NOTE — BRIEF OPERATIVE NOTE - NSICDXBRIEFPROCEDURE_GEN_ALL_CORE_FT
PROCEDURES:  External hemorrhoidectomy involving multiple anal columns 21-Apr-2023 11:41:43  Trip Odell Sub

## 2023-04-21 NOTE — BRIEF OPERATIVE NOTE - OPERATION/FINDINGS
Excisional hemorrhoidectomy with botox injection to internal sphincter. Right posteriolateral hemorrhoid with anal fissure was excised. Next left posterior lateral hemorrhoid excised. Hemostasis achieved. Incisions closed with 3.0 vicryl sutures. Next internal sphincter was identified and botox was injected. Fibrilar was packed for hemostasis. Gauze and abd pad placed above anus.

## 2023-04-21 NOTE — BRIEF OPERATIVE NOTE - NSICDXBRIEFPOSTOP_GEN_ALL_CORE_FT
POST-OP DIAGNOSIS:  Anal fissure 21-Apr-2023 11:41:15  Trip Odell  Hemorrhoids 21-Apr-2023 11:41:13  Trip Odell Sub

## 2023-04-28 PROBLEM — U07.1 COVID-19: Chronic | Status: ACTIVE | Noted: 2023-04-17

## 2023-04-28 PROBLEM — K60.2 ANAL FISSURE, UNSPECIFIED: Chronic | Status: ACTIVE | Noted: 2023-04-17

## 2023-04-28 PROBLEM — K64.4 RESIDUAL HEMORRHOIDAL SKIN TAGS: Chronic | Status: ACTIVE | Noted: 2023-04-17

## 2023-04-28 PROBLEM — F12.90 CANNABIS USE, UNSPECIFIED, UNCOMPLICATED: Chronic | Status: ACTIVE | Noted: 2023-04-17

## 2023-05-01 ENCOUNTER — APPOINTMENT (OUTPATIENT)
Dept: SURGERY | Facility: CLINIC | Age: 28
End: 2023-05-01
Payer: COMMERCIAL

## 2023-05-01 VITALS
SYSTOLIC BLOOD PRESSURE: 117 MMHG | DIASTOLIC BLOOD PRESSURE: 80 MMHG | HEART RATE: 80 BPM | RESPIRATION RATE: 17 BRPM | TEMPERATURE: 97.1 F | OXYGEN SATURATION: 99 %

## 2023-05-01 DIAGNOSIS — Z09 ENCOUNTER FOR FOLLOW-UP EXAMINATION AFTER COMPLETED TREATMENT FOR CONDITIONS OTHER THAN MALIGNANT NEOPLASM: ICD-10-CM

## 2023-05-01 LAB — SURGICAL PATHOLOGY STUDY: SIGNIFICANT CHANGE UP

## 2023-05-01 PROCEDURE — 99024 POSTOP FOLLOW-UP VISIT: CPT

## 2023-05-01 RX ORDER — OXYCODONE 5 MG/1
5 TABLET ORAL
Refills: 0 | Status: ACTIVE | COMMUNITY

## 2023-05-01 RX ORDER — PHENAZOPYRIDINE HYDROCHLORIDE 200 MG/1
200 TABLET ORAL
Qty: 6 | Refills: 0 | Status: DISCONTINUED | COMMUNITY
Start: 2022-12-15

## 2023-05-01 NOTE — ASSESSMENT
[FreeTextEntry1] : Doing well postop.\par Path pending. \par Instructions for diet, activity, wound care given, all questions answered. Advised to continue fiber supplements. \par RTO in 3-4 weeks.\par

## 2023-05-01 NOTE — PHYSICAL EXAM
[FreeTextEntry1] : In NAD.\par Perineum with clean, pink wounds and mild postop (expected) tissue swelling. Small residual suture trimmed off.\par \par

## 2023-05-01 NOTE — HISTORY OF PRESENT ILLNESS
[FreeTextEntry1] : KASSY is a 28 year old female being seen for a post op visit. \par \par Called NP Della on 4/28/23 - Pt is s/o hemorrhoidectomy and botox injection by Dr. Marquez on 4/21. She called c/o stich popping out. Also having increased incisional pain. Scant BRBPR noted in toilet. Picture received and reviewed by Dr. Pressley. Pt reassured. She agreed to take oxycodone PRN in additional to advil and tylenol. She has an appointment with Dr. Ordonez on Monday. She will call the office immediately with any questions or concerns. \par \par Today pt reports she feels much better compared to preop.  Only has minor incisional discomfort.  Daily BMs, formed, no straining, daily Metamucil and PRN milk of magnesium.  No bleeding since 4/28, no episodes of incontinence, drainage is minimal to none currently. Denies fevers or chills.

## 2023-05-01 NOTE — REASON FOR VISIT
[Post Op: _________] : a [unfilled] post op visit [FreeTextEntry1] : External hemorrhoidectomy and Botox injection on 4/21/23

## 2023-05-22 ENCOUNTER — APPOINTMENT (OUTPATIENT)
Dept: SURGERY | Facility: CLINIC | Age: 28
End: 2023-05-22

## 2023-09-06 ENCOUNTER — TRANSCRIPTION ENCOUNTER (OUTPATIENT)
Age: 28
End: 2023-09-06

## 2024-07-01 ENCOUNTER — RX ONLY (RX ONLY)
Age: 29
End: 2024-07-01

## 2024-07-01 ENCOUNTER — OFFICE (OUTPATIENT)
Dept: URBAN - METROPOLITAN AREA CLINIC 35 | Facility: CLINIC | Age: 29
Setting detail: OPHTHALMOLOGY
End: 2024-07-01
Payer: MEDICAID

## 2024-07-01 DIAGNOSIS — H18.831: ICD-10-CM

## 2024-07-01 PROCEDURE — 92002 INTRM OPH EXAM NEW PATIENT: CPT | Performed by: OPHTHALMOLOGY

## 2024-07-05 ENCOUNTER — OFFICE (OUTPATIENT)
Dept: URBAN - METROPOLITAN AREA CLINIC 35 | Facility: CLINIC | Age: 29
Setting detail: OPHTHALMOLOGY
End: 2024-07-05
Payer: COMMERCIAL

## 2024-07-05 DIAGNOSIS — H16.221: ICD-10-CM

## 2024-07-05 DIAGNOSIS — H18.831: ICD-10-CM

## 2024-07-05 PROCEDURE — 99213 OFFICE O/P EST LOW 20 MIN: CPT | Performed by: OPHTHALMOLOGY

## 2024-07-18 ENCOUNTER — RX ONLY (RX ONLY)
Age: 29
End: 2024-07-18

## 2024-07-18 ENCOUNTER — OFFICE (OUTPATIENT)
Dept: URBAN - METROPOLITAN AREA CLINIC 102 | Facility: CLINIC | Age: 29
Setting detail: OPHTHALMOLOGY
End: 2024-07-18
Payer: COMMERCIAL

## 2024-07-18 DIAGNOSIS — H18.831: ICD-10-CM

## 2024-07-18 PROCEDURE — 92012 INTRM OPH EXAM EST PATIENT: CPT | Performed by: OPHTHALMOLOGY

## 2024-07-18 ASSESSMENT — CONFRONTATIONAL VISUAL FIELD TEST (CVF)
OD_FINDINGS: FULL
OS_FINDINGS: FULL

## 2024-07-27 ENCOUNTER — RX ONLY (RX ONLY)
Age: 29
End: 2024-07-27

## 2024-07-27 ENCOUNTER — EMERGENCY (EMERGENCY)
Facility: HOSPITAL | Age: 29
LOS: 1 days | Discharge: HOME | End: 2024-07-27
Attending: STUDENT IN AN ORGANIZED HEALTH CARE EDUCATION/TRAINING PROGRAM
Payer: COMMERCIAL

## 2024-07-27 ENCOUNTER — OFFICE (OUTPATIENT)
Dept: URBAN - METROPOLITAN AREA CLINIC 27 | Facility: CLINIC | Age: 29
Setting detail: OPHTHALMOLOGY
End: 2024-07-27
Payer: COMMERCIAL

## 2024-07-27 VITALS
TEMPERATURE: 98 F | OXYGEN SATURATION: 99 % | RESPIRATION RATE: 20 BRPM | DIASTOLIC BLOOD PRESSURE: 84 MMHG | HEIGHT: 62 IN | HEART RATE: 76 BPM | WEIGHT: 235.01 LBS | SYSTOLIC BLOOD PRESSURE: 124 MMHG

## 2024-07-27 DIAGNOSIS — H18.831: ICD-10-CM

## 2024-07-27 DIAGNOSIS — K08.409 PARTIAL LOSS OF TEETH, UNSPECIFIED CAUSE, UNSPECIFIED CLASS: Chronic | ICD-10-CM

## 2024-07-27 PROCEDURE — 99213 OFFICE O/P EST LOW 20 MIN: CPT | Performed by: OPTOMETRIST

## 2024-07-27 PROCEDURE — 99283 EMERGENCY DEPT VISIT LOW MDM: CPT

## 2024-07-27 PROCEDURE — 92071 CONTACT LENS FITTING FOR TX: CPT | Mod: RT | Performed by: OPTOMETRIST

## 2024-07-27 PROCEDURE — 99284 EMERGENCY DEPT VISIT MOD MDM: CPT

## 2024-07-27 RX ORDER — FLUORESCEIN SODIUM 100 %
1 POWDER (GRAM) MISCELLANEOUS ONCE
Refills: 0 | Status: COMPLETED | OUTPATIENT
Start: 2024-07-27 | End: 2024-07-27

## 2024-07-27 RX ORDER — TETRACAINE HCL 0.5 %
1 DROPS OPHTHALMIC (EYE) ONCE
Refills: 0 | Status: COMPLETED | OUTPATIENT
Start: 2024-07-27 | End: 2024-07-27

## 2024-07-27 RX ADMIN — Medication 1 APPLICATION(S): at 09:20

## 2024-07-27 RX ADMIN — Medication 1 DROP(S): at 09:20

## 2024-07-27 NOTE — ED PROVIDER NOTE - PATIENT PORTAL LINK FT
You can access the FollowMyHealth Patient Portal offered by [facility] by registering at the following website: [email or web address]. By joining [employer]’s FollowMyHealth portal, you will also be able to view your health information using other applications (apps) compatible with our system.

## 2024-07-27 NOTE — ED PROVIDER NOTE - PROGRESS NOTE DETAILS
Dr. Lisa Culp, ATTENDING: patient was able to get in contact with private optho and they will see her today at 1030hrs. Will hold off on prescribing abx as pt will be seen promptly.

## 2024-07-27 NOTE — ED PROVIDER NOTE - ATTENDING APP SHARED VISIT CONTRIBUTION OF CARE
28 yo F with no reported PMH, presents with right eye pain x1 month. 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 Patient states improved pain however over the past 3 days, pain has worsened with associated photophobia and tearing. Taking NSAIDs x8hrs with some relief. Denies any new trauma. Continued blurriness from when last saw outpatient Ophthalmologist last week. Vitals WNL. On exam, b/l eyes without injected conjunctival. Right eye has no perilimbal injection. PERRL. Fluorescein exam on right eye revealed uptake at 12 o'clock on cornea, concerning for corneal abrasions vs erosion. 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 Low concern for globe rupture. At this time, will call ophtho for any further reccs for pain control, however no ophthalmological emergency at this time. 30 yo F with no reported PMH, presents with right eye pain x1 month. 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 Patient states improved pain however over the past 3 days, pain has worsened with associated photophobia and tearing. Taking NSAIDs x8hrs with some relief. Denies any new trauma. Continued blurriness from when last saw outpatient Ophthalmologist last week. Vitals WNL. On exam, b/l eyes without injected conjunctival. Right eye has no perilimbal injection. PERRL. Fluorescein exam on right eye revealed uptake at 12 o'clock on cornea, concerning for corneal abrasions vs ulcer. 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 Low concern for globe rupture. Will dc with otic ofloxacin and dc with outpatient Optho follow up. Patient agrees with plan. Strict return precautions discussed.

## 2024-07-27 NOTE — ED ADULT NURSE NOTE - OBJECTIVE STATEMENT
0846 pt 29yf aox4 c/o rt eye inj before the July 4th seen by opthomologist dx scratched cornea w/ eye drops, today inc pain to site, in no acute distress pending eval

## 2024-07-27 NOTE — ED ADULT NURSE NOTE - NSFALLHARMRISKINTERV_ED_ALL_ED

## 2024-07-27 NOTE — ED PROVIDER NOTE - PHYSICAL EXAMINATION
Physical Exam:  Gen: NAD, awake and alert, non-toxic appearing  HEENT: Atraumatic, oropharynx clear, moist mucous membranes, normal conjunctiva  Eyes: b/l eyes without injected conjunctival. Right eye has no perilimbal injection. PERRL. Fluorescein exam on right eye revealed uptake at 12 o'clock on cornea, concerning for corneal abrasions vs ulcer. 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  Cardio: RRR, no murmurs, rubs or gallops  Lung: CTAB, no respiratory distress, no wheezes/rhonchi/rales B/L  MSK: no visible deformities, ROMx4

## 2024-07-27 NOTE — ED PROVIDER NOTE - NSFOLLOWUPINSTRUCTIONS_ED_ALL_ED_FT
You were seen in the Emergency Department for eye pain. Exam revealed an on going abrasions vs ulcer. You were able to get in contact with your eye doctor and have an appointment with them. It is very important you go directly there for further care.    Return for any worsening and or concerning symptoms.

## 2024-09-20 ENCOUNTER — NON-APPOINTMENT (OUTPATIENT)
Age: 29
End: 2024-09-20

## 2024-10-05 ENCOUNTER — NON-APPOINTMENT (OUTPATIENT)
Age: 29
End: 2024-10-05

## (undated) DEVICE — SPECIMEN CONTAINER 100ML

## (undated) DEVICE — VENODYNE/SCD SLEEVE CALF MEDIUM

## (undated) DEVICE — GLV 7 PROTEXIS (BLUE)

## (undated) DEVICE — PACK MINOR

## (undated) DEVICE — DRAPE TOWEL BLUE 17" X 24"

## (undated) DEVICE — MEDICATION LABELS W MARKER

## (undated) DEVICE — PREP BETADINE SPONGE STICKS

## (undated) DEVICE — WARMING BLANKET UPPER ADULT

## (undated) DEVICE — SOL IRR POUR H2O 250ML

## (undated) DEVICE — DRAPE INSTRUMENT POUCH 6.75" X 11"

## (undated) DEVICE — LUBRICATING JELLY ONESHOT 1.25OZ

## (undated) DEVICE — GLV 6.5 PROTEXIS (WHITE)

## (undated) DEVICE — SUT POLYSORB 3-0 30" V-20 UNDYED

## (undated) DEVICE — POSITIONER FOAM EGG CRATE ULNAR 2PCS (PINK)

## (undated) DEVICE — DRAPE THYROID 77" X 123"

## (undated) DEVICE — DRSG COMBINE 5X9"

## (undated) DEVICE — LAP PAD 18 X 18"

## (undated) DEVICE — SOL IRR POUR NS 0.9% 500ML